# Patient Record
Sex: MALE | Race: WHITE | NOT HISPANIC OR LATINO | Employment: OTHER | URBAN - METROPOLITAN AREA
[De-identification: names, ages, dates, MRNs, and addresses within clinical notes are randomized per-mention and may not be internally consistent; named-entity substitution may affect disease eponyms.]

---

## 2017-10-10 ENCOUNTER — OFFICE VISIT (OUTPATIENT)
Dept: MEDICAL GROUP | Facility: MEDICAL CENTER | Age: 62
End: 2017-10-10
Payer: COMMERCIAL

## 2017-10-10 VITALS
RESPIRATION RATE: 16 BRPM | HEART RATE: 72 BPM | OXYGEN SATURATION: 95 % | SYSTOLIC BLOOD PRESSURE: 118 MMHG | DIASTOLIC BLOOD PRESSURE: 76 MMHG | WEIGHT: 230 LBS | TEMPERATURE: 97.4 F | BODY MASS INDEX: 34.07 KG/M2 | HEIGHT: 69 IN

## 2017-10-10 DIAGNOSIS — Z00.00 ANNUAL PHYSICAL EXAM: ICD-10-CM

## 2017-10-10 DIAGNOSIS — E66.9 OBESITY (BMI 30-39.9): ICD-10-CM

## 2017-10-10 DIAGNOSIS — N52.9 ERECTILE DYSFUNCTION, UNSPECIFIED ERECTILE DYSFUNCTION TYPE: ICD-10-CM

## 2017-10-10 PROCEDURE — 99396 PREV VISIT EST AGE 40-64: CPT | Performed by: PHYSICIAN ASSISTANT

## 2017-10-10 ASSESSMENT — PATIENT HEALTH QUESTIONNAIRE - PHQ9: CLINICAL INTERPRETATION OF PHQ2 SCORE: 0

## 2017-10-10 NOTE — LETTER
[employer]  Topher Brunner P.A.-C.  [address]  [place] [address]  Fax: [phone]   Authorization for Release/Disclosure of   Protected Health Information   Name: DINSEH GODOY : 1955 SSN: [id number]   Address: [address] [place] [address] Phone:    [phone] (home)    I authorize the entity listed below to release/disclose the PHI below to:   [employer]/Topher Brunner P.A.-C. and Topher Brunner P.A.-C.   Provider or Entity Name:  [facility]   Address   [facility], [place], [place]   Phone:      Fax:     Reason for request: continuity of care   Information to be released:    [  ] LAST COLONOSCOPY,  including any PATH REPORT and follow-up  [  ] LAST FIT/COLOGUARD RESULT [  ] LAST DEXA  [  ] LAST MAMMOGRAM  [  ] LAST PAP  [  ] LAST LABS [  ] RETINA EXAM REPORT  [  ] IMMUNIZATION RECORDS  [ x ] Release all info      [  ] Check here and initial the line next to each item to release ALL health information INCLUDING  _____ Care and treatment for drug and / or alcohol abuse  _____ HIV testing, infection status, or AIDS  _____ Genetic Testing    DATES OF SERVICE OR TIME PERIOD TO BE DISCLOSED: _____________  I understand and acknowledge that:  * This Authorization may be revoked at any time by you in writing, except if your health information has already been used or disclosed.  * Your health information that will be used or disclosed as a result of you signing this authorization could be re-disclosed by the recipient. If this occurs, your re-disclosed health information may no longer be protected by State or Federal laws.  * You may refuse to sign this Authorization. Your refusal will not affect your ability to obtain treatment.  * This Authorization becomes effective upon signing and will  on (date) __________.      If no date is indicated, this Authorization will  one (1) year from the signature date.    Name: Dinesh Godoy    Signature:   Date:          10/10/2017       PLEASE FAX REQUESTED RECORDS BACK TO: [phone]

## 2017-10-10 NOTE — ASSESSMENT & PLAN NOTE
This is a 61-year-old male who is here today for a physical. Has a form to be completed by his employer. Has no significant chronic medical conditions. Did have his gallbladder removed last year. Had a colonoscopy about 2 years ago. No polyps were found. Has never had chickenpox. Has never had a influenza vaccination. Never got the flu. He currently works from home and flies occasionally to [facility].

## 2017-10-10 NOTE — ASSESSMENT & PLAN NOTE
Doesn't have intercourse often with his wife but does suffer from some erectile dysfunction. Requesting possible medication.

## 2017-10-10 NOTE — LETTER
[facility]  Topher Brunner P.A.-C.  [address]  [place] [address]  Fax: [phone]   Authorization for Release/Disclosure of   Protected Health Information   Name: DINESH JOHNS : 1955 SSN: [id number]   [address] [place] [address] Phone:    [phone] (home)    I authorize the entity listed below to release/disclose the PHI below to:   [facility]/Topher Brunner P.A.-C. and Topher Brunner P.A.-C.   Provider or Entity Name:  [facility]   Address   City, State, [facility]   Phone:      Fax:     Reason for request: continuity of care   Information to be released:    [x  ] LAST COLONOSCOPY,  including any PATH REPORT and follow-up  [ x ] LAST FIT/COLOGUARD RESULT [  ] LAST DEXA  [  ] LAST MAMMOGRAM  [  ] LAST PAP  [  ] LAST LABS [  ] RETINA EXAM REPORT  [  ] IMMUNIZATION RECORDS  [  ] Release all info      [  ] Check here and initial the line next to each item to release ALL health information INCLUDING  _____ Care and treatment for drug and / or alcohol abuse  _____ HIV testing, infection status, or AIDS  _____ Genetic Testing    DATES OF SERVICE OR TIME PERIOD TO BE DISCLOSED: _____________  I understand and acknowledge that:  * This Authorization may be revoked at any time by you in writing, except if your health information has already been used or disclosed.  * Your health information that will be used or disclosed as a result of you signing this authorization could be re-disclosed by the recipient. If this occurs, your re-disclosed health information may no longer be protected by State or Federal laws.  * You may refuse to sign this Authorization. Your refusal will not affect your ability to obtain treatment.  * This Authorization becomes effective upon signing and will  on (date) __________.      If no date is indicated, this Authorization will  one (1) year from the signature date.    Name: Dinesh Johns    Signature:   Date:     10/10/2017       PLEASE FAX REQUESTED RECORDS BACK TO: [phone]

## 2017-10-10 NOTE — PROGRESS NOTES
"Subjective:   Dinesh Godoy is a 61 y.o. male here today for annual exam.    Annual physical exam  This is a 61-year-old male who is here today for a physical. Has a form to be completed by his employer. Has no significant chronic medical conditions. Did have his gallbladder removed last year. Had a colonoscopy about 2 years ago. No polyps were found. Has never had chickenpox. Has never had a influenza vaccination. Never got the flu. He currently works from home and flies occasionally to [facility].    Erectile dysfunction  Doesn't have intercourse often with his wife but does suffer from some erectile dysfunction. Requesting possible medication.       Current medicines (including changes today)  No current outpatient prescriptions on file.     No current facility-administered medications for this visit.      He  has no past medical history on file.    ROS   No chest pain, no shortness of breath, no abdominal pain and all other systems were reviewed and are negative.       Objective:     Blood pressure 118/76, pulse 72, temperature 36.3 °C (97.4 °F), resp. rate 16, height 1.753 m (5' 9\"), weight 104.3 kg (230 lb), SpO2 95 %. Body mass index is 33.97 kg/m².   Physical Exam:  Constitutional: Alert, no distress.  Skin: Warm, dry, good turgor, no rashes in visible areas.  Eye: Equal, round and reactive, conjunctiva clear, lids normal.  ENMT: Lips without lesions, good dentition, oropharynx clear.  Neck: Trachea midline, no masses.   Lymph: No cervical or supraclavicular lymphadenopathy  Respiratory: Unlabored respiratory effort, lungs clear to auscultation, no wheezes, no ronchi.  Cardiovascular: Normal S1, S2, no murmur, no edema.  Abdomen: Soft, non-tender, no masses.  Psych: Alert and oriented x3, normal affect and mood.        Assessment and Plan:   The following treatment plan was discussed    1. Annual physical exam  Ordered labs fasting. We'll keep form and then have him come and pick it up once his labs " are completed.  - PROSTATE SPECIFIC AG  - LIPID PANEL  - HEMOGLOBIN A1C; Future  - COMP METABOLIC PANEL; Future    2. Obesity (BMI 30-39.9)  Advised exercise routinely and eat healthy.  - Patient identified as having weight management issue.  Appropriate orders and counseling given.    3. Erectile dysfunction, unspecified erectile dysfunction type  Chronic condition. Once labs are completed will supply him Viagra 50 mg as directed.      Followup: Return if symptoms worsen or fail to improve.    Please note that this dictation was created using voice recognition software. I have made every reasonable attempt to correct obvious errors, but I expect that there are errors of grammar and possibly content that I did not discover before finalizing the note.

## 2017-10-12 ENCOUNTER — HOSPITAL ENCOUNTER (OUTPATIENT)
Dept: LAB | Facility: MEDICAL CENTER | Age: 62
End: 2017-10-12
Attending: PHYSICIAN ASSISTANT
Payer: COMMERCIAL

## 2017-10-12 DIAGNOSIS — Z00.00 ANNUAL PHYSICAL EXAM: ICD-10-CM

## 2017-10-12 LAB
ALBUMIN SERPL BCP-MCNC: 4.2 G/DL (ref 3.2–4.9)
ALBUMIN/GLOB SERPL: 1.5 G/DL
ALP SERPL-CCNC: 58 U/L (ref 30–99)
ALT SERPL-CCNC: 23 U/L (ref 2–50)
ANION GAP SERPL CALC-SCNC: 7 MMOL/L (ref 0–11.9)
AST SERPL-CCNC: 22 U/L (ref 12–45)
BILIRUB SERPL-MCNC: 0.5 MG/DL (ref 0.1–1.5)
BUN SERPL-MCNC: 25 MG/DL (ref 8–22)
CALCIUM SERPL-MCNC: 9.3 MG/DL (ref 8.5–10.5)
CHLORIDE SERPL-SCNC: 106 MMOL/L (ref 96–112)
CHOLEST SERPL-MCNC: 167 MG/DL (ref 100–199)
CO2 SERPL-SCNC: 26 MMOL/L (ref 20–33)
CREAT SERPL-MCNC: 1.2 MG/DL (ref 0.5–1.4)
EST. AVERAGE GLUCOSE BLD GHB EST-MCNC: 128 MG/DL
GFR SERPL CREATININE-BSD FRML MDRD: >60 ML/MIN/1.73 M 2
GLOBULIN SER CALC-MCNC: 2.8 G/DL (ref 1.9–3.5)
GLUCOSE SERPL-MCNC: 92 MG/DL (ref 65–99)
HBA1C MFR BLD: 6.1 % (ref 0–5.6)
HDLC SERPL-MCNC: 45 MG/DL
LDLC SERPL CALC-MCNC: 98 MG/DL
POTASSIUM SERPL-SCNC: 4.4 MMOL/L (ref 3.6–5.5)
PROT SERPL-MCNC: 7 G/DL (ref 6–8.2)
PSA SERPL-MCNC: 1.83 NG/ML (ref 0–4)
SODIUM SERPL-SCNC: 139 MMOL/L (ref 135–145)
TRIGL SERPL-MCNC: 118 MG/DL (ref 0–149)

## 2017-10-12 PROCEDURE — 80061 LIPID PANEL: CPT

## 2017-10-12 PROCEDURE — 36415 COLL VENOUS BLD VENIPUNCTURE: CPT

## 2017-10-12 PROCEDURE — 80053 COMPREHEN METABOLIC PANEL: CPT

## 2017-10-12 PROCEDURE — 83036 HEMOGLOBIN GLYCOSYLATED A1C: CPT

## 2017-10-12 PROCEDURE — 84153 ASSAY OF PSA TOTAL: CPT

## 2017-10-13 ENCOUNTER — TELEPHONE (OUTPATIENT)
Dept: MEDICAL GROUP | Facility: MEDICAL CENTER | Age: 62
End: 2017-10-13

## 2017-10-13 NOTE — TELEPHONE ENCOUNTER
Phone Number Called: [phone] (home)      Message: Left message for patient to call us back.     Left Message for patient to call back: yes

## 2017-10-13 NOTE — TELEPHONE ENCOUNTER
----- Message from Topher Brunner P.A.-C. sent at 10/13/2017  7:12 AM PDT -----  Please contact Edu.  He has the beginning of prediabetes.  Eat healthier and exercise routinely.  F/u annual labs.  Thank you.    Topher

## 2017-10-16 NOTE — TELEPHONE ENCOUNTER
Phone Number Called: [phone] (home)       Message: Patient informed of msg.     Left Message for patient to call back: yes

## 2019-10-11 ENCOUNTER — OFFICE VISIT (OUTPATIENT)
Dept: URGENT CARE | Facility: CLINIC | Age: 64
End: 2019-10-11
Payer: MEDICARE

## 2019-10-11 VITALS
HEART RATE: 92 BPM | SYSTOLIC BLOOD PRESSURE: 108 MMHG | TEMPERATURE: 97.4 F | OXYGEN SATURATION: 95 % | HEIGHT: 69 IN | BODY MASS INDEX: 32.73 KG/M2 | RESPIRATION RATE: 14 BRPM | WEIGHT: 221 LBS | DIASTOLIC BLOOD PRESSURE: 68 MMHG

## 2019-10-11 DIAGNOSIS — J20.9 ACUTE BRONCHITIS, UNSPECIFIED ORGANISM: ICD-10-CM

## 2019-10-11 DIAGNOSIS — R05.9 COUGH: ICD-10-CM

## 2019-10-11 PROCEDURE — 99214 OFFICE O/P EST MOD 30 MIN: CPT | Performed by: NURSE PRACTITIONER

## 2019-10-11 RX ORDER — CODEINE PHOSPHATE AND GUAIFENESIN 10; 100 MG/5ML; MG/5ML
5 SOLUTION ORAL EVERY 4 HOURS PRN
Qty: 240 ML | Refills: 0 | Status: SHIPPED | OUTPATIENT
Start: 2019-10-11 | End: 2019-10-19

## 2019-10-11 RX ORDER — BENZONATATE 100 MG/1
100 CAPSULE ORAL 3 TIMES DAILY PRN
Qty: 60 CAP | Refills: 0 | Status: SHIPPED | OUTPATIENT
Start: 2019-10-11 | End: 2019-12-02

## 2019-10-11 RX ORDER — DOXYCYCLINE HYCLATE 100 MG/1
100 CAPSULE ORAL 2 TIMES DAILY
Qty: 20 CAP | Refills: 0 | Status: SHIPPED | OUTPATIENT
Start: 2019-10-11 | End: 2019-10-21

## 2019-10-11 ASSESSMENT — ENCOUNTER SYMPTOMS
EYE PAIN: 0
BACK PAIN: 0
PALPITATIONS: 0
CHILLS: 1
SORE THROAT: 1
EYE DISCHARGE: 0
SPUTUM PRODUCTION: 1
SHORTNESS OF BREATH: 1
SINUS PAIN: 1
HEADACHES: 0
NAUSEA: 0
COUGH: 1
WHEEZING: 0
DIZZINESS: 0
FEVER: 0

## 2019-10-11 ASSESSMENT — LIFESTYLE VARIABLES: SUBSTANCE_ABUSE: 0

## 2019-10-11 NOTE — PROGRESS NOTES
"Subjective:      Edu Godoy is a 63 y.o. male who presents with Cough (x5 day, chest congestion)      Reviewed past medical, surgical and family history. Reviewed prescription and OTC medications with patient in electronic health record today      No Known Allergies      HPI This is a new problem.  Edu is a 64 y/o male pt with cough and congestion x 2 weeks but significantly increased for the past 5 days. Dark green phlegm all day long. Hard to sleep secondary to coughing. Cough increases at night when he lays down. Treatments tried: Zinc and Silver Cillin. Mucinex .    This has not been helpful.   No other aggravating or alleviating factors.       Review of Systems   Constitutional: Positive for chills and malaise/fatigue. Negative for fever.   HENT: Positive for congestion, sinus pain and sore throat. Negative for ear pain.    Eyes: Negative for pain and discharge.   Respiratory: Positive for cough, sputum production and shortness of breath. Negative for wheezing.    Cardiovascular: Negative for chest pain and palpitations.   Gastrointestinal: Negative for nausea.   Musculoskeletal: Negative for back pain.   Neurological: Negative for dizziness and headaches.   Endo/Heme/Allergies: Negative for environmental allergies.   Psychiatric/Behavioral: Negative for substance abuse.          Objective:     /68 (BP Location: Left arm, Patient Position: Sitting)   Pulse 92   Temp 36.3 °C (97.4 °F)   Resp 14   Ht 1.753 m (5' 9\")   Wt 100.2 kg (221 lb)   SpO2 95%   BMI 32.64 kg/m²      Physical Exam   Constitutional: He is oriented to person, place, and time. Vital signs are normal. He appears well-developed and well-nourished.  Non-toxic appearance. No distress.   HENT:   Head: Normocephalic and atraumatic.   Right Ear: External ear normal.   Left Ear: External ear normal.   Nose: Nose normal.   Mouth/Throat: Oropharynx is clear and moist.   Eyes: Pupils are equal, round, and reactive to light. Conjunctivae are " normal. Right eye exhibits no discharge. Left eye exhibits no discharge.   Neck: Neck supple.   Cardiovascular: Normal rate and regular rhythm.   Pulmonary/Chest: Effort normal and breath sounds normal.   Lymphadenopathy:     He has no cervical adenopathy.        Right: No supraclavicular adenopathy present.        Left: No supraclavicular adenopathy present.   Neurological: He is alert and oriented to person, place, and time.   Skin: Skin is warm and dry. Capillary refill takes less than 2 seconds.   Psychiatric: He has a normal mood and affect. His behavior is normal.   Nursing note and vitals reviewed.              Assessment/Plan:     1. Cough  guaifenesin-codeine (ROBITUSSIN AC) Solution oral solution    benzonatate (TESSALON) 100 MG Cap   2. Acute bronchitis, unspecified organism  doxycycline (VIBRAMYCIN) 100 MG Cap     Educated in proper administration of medication(s) ordered today including safety, possible SE, risks, benefits, rationale and alternatives to therapy.       OTC antihistamine of choice. Follow manufactures dosing and safety guidelines.     Stay well hydrated    Return to clinic or PCP 5-7  days if current symptoms are not resolving in a satisfactory manner or sooner if new or worsening symptoms occur. Differential diagnosis, natural history, supportive care, and indications for immediate follow-up discussed at length.   Patient was advised of signs and symptoms which would warrant further evaluation and /or emergent evaluation in ER.  Verbalized agreement with this treatment plan and seemed to understand without barriers. Questions were encouraged and answered to patients satisfaction.     Advised not to drink ETOH, drive car or operate machinery while taking narcotic RX . Verbalizes understanding of safety instructions. Narc Check verified. [facility] Aware web site evaluation: I have obtained and reviewed patient utilization report from [facility] pharmacy database prior to writing  prescription for controlled substance II, III or IV per [place] bill . Opioid Risk Tool screen completed.  I believe the benefits of controlled substance therapy outweigh the risks. The reasons for prescribing controlled substances include non-narcotic, oral analgesic alternatives have been inadequate for pain control. Accordingly, I have discussed the risk and benefits, treatment plan, and alternative therapies with the patient.

## 2019-12-02 ENCOUNTER — OFFICE VISIT (OUTPATIENT)
Dept: MEDICAL GROUP | Facility: MEDICAL CENTER | Age: 64
End: 2019-12-02
Payer: MEDICARE

## 2019-12-02 VITALS
DIASTOLIC BLOOD PRESSURE: 68 MMHG | WEIGHT: 222 LBS | HEART RATE: 68 BPM | TEMPERATURE: 97.5 F | HEIGHT: 69 IN | SYSTOLIC BLOOD PRESSURE: 106 MMHG | BODY MASS INDEX: 32.88 KG/M2 | OXYGEN SATURATION: 94 % | RESPIRATION RATE: 16 BRPM

## 2019-12-02 DIAGNOSIS — L30.9 DERMATITIS: ICD-10-CM

## 2019-12-02 DIAGNOSIS — E66.9 OBESITY (BMI 30-39.9): ICD-10-CM

## 2019-12-02 DIAGNOSIS — Z28.21 PNEUMOCOCCAL VACCINATION DECLINED BY PATIENT: ICD-10-CM

## 2019-12-02 PROCEDURE — 99213 OFFICE O/P EST LOW 20 MIN: CPT | Performed by: PHYSICIAN ASSISTANT

## 2019-12-02 RX ORDER — ANASTROZOLE 1 MG/1
1 TABLET ORAL
Refills: 1 | COMMUNITY
Start: 2019-10-11 | End: 2021-01-18

## 2019-12-02 ASSESSMENT — PATIENT HEALTH QUESTIONNAIRE - PHQ9: CLINICAL INTERPRETATION OF PHQ2 SCORE: 0

## 2019-12-02 NOTE — ASSESSMENT & PLAN NOTE
This is a 64-year-old male who comes in today to discuss noticing a red romy on his right arm for a couple months.  States that he gave blood in the spring and then a couple months later the romy showed up.  Denies any itching.  Is concerned about a possible precancerous lesion.  Has no history of any recent lesions.  Believes that the romy has been spreading.  Does have a red romy on his left shoulder that he believes is dry skin.  Applies cream as needed.  No other complaints today.  Currently not on any medications.  Is on his last bottle of anastrozole.

## 2019-12-02 NOTE — PROGRESS NOTES
"Subjective:   Dinesh Godoy is a 64 y.o. male here today for lesion on his right antecubital fossa for couple months.    Dermatitis  This is a 64-year-old male who comes in today to discuss noticing a red romy on his right arm for a couple months.  States that he gave blood in the spring and then a couple months later the romy showed up.  Denies any itching.  Is concerned about a possible precancerous lesion.  Has no history of any recent lesions.  Believes that the romy has been spreading.  Does have a red romy on his left shoulder that he believes is dry skin.  Applies cream as needed.  No other complaints today.  Currently not on any medications.  Is on his last bottle of anastrozole.       Current medicines (including changes today)  Current Outpatient Medications   Medication Sig Dispense Refill   • anastrozole (ARIMIDEX) 1 MG Tab Take 1 mg by mouth.  1     No current facility-administered medications for this visit.      He  has no past medical history on file.    Social History and Family History were reviewed and updated.    ROS   No chest pain, no shortness of breath, no abdominal pain and all other systems were reviewed and are negative.       Objective:     /68 (BP Location: Right arm, Patient Position: Sitting, BP Cuff Size: Adult)   Pulse 68   Temp 36.4 °C (97.5 °F) (Temporal)   Resp 16   Ht 1.753 m (5' 9\")   Wt 100.7 kg (222 lb)   SpO2 94%  Body mass index is 32.78 kg/m².   Physical Exam:  Constitutional: Alert, no distress.  Skin: Warm, dry, good turgor, no rashes in visible areas.  There are approximately 5 mm x 3 mm.  Appears slightly erythematous.  No significant discoloration noted.  No induration.  Similar but larger romy on the left shoulder.  Eye: Equal, round and reactive, conjunctiva clear, lids normal.  ENMT: Lips without lesions, good dentition, oropharynx clear.  Neck: Trachea midline, no masses.   Lymph: No cervical or supraclavicular lymphadenopathy  Respiratory: Unlabored " respiratory effort, lungs clear to auscultation, no wheezes, no ronchi.  Cardiovascular: Normal S1, S2, no murmur, no edema.  Psych: Alert and oriented x3, normal affect and mood.        Assessment and Plan:   The following treatment plan was discussed    1. Dermatitis  Acute, new onset condition.  Likely not a precancerous lesion.  Advised to make note of it with a photo.  With any changes he is to contact me through my chart for a referral to dermatology.    2. Obesity (BMI 30-39.9)  Chronic condition.  We will continue to monitor.  - Patient identified as having weight management issue.  Appropriate orders and counseling given.    3. Pneumococcal vaccination declined by patient  Declined vaccinations.  Updated healthcare maintenance items.  Also declined hep C viral antibody evaluation.      Followup: Return if symptoms worsen or fail to improve.    Please note that this dictation was created using voice recognition software. I have made every reasonable attempt to correct obvious errors, but I expect that there are errors of grammar and possibly content that I did not discover before finalizing the note.

## 2021-01-18 ENCOUNTER — OFFICE VISIT (OUTPATIENT)
Dept: MEDICAL GROUP | Facility: MEDICAL CENTER | Age: 66
End: 2021-01-18
Payer: MEDICARE

## 2021-01-18 VITALS
HEART RATE: 78 BPM | DIASTOLIC BLOOD PRESSURE: 68 MMHG | BODY MASS INDEX: 33.18 KG/M2 | RESPIRATION RATE: 16 BRPM | WEIGHT: 224 LBS | SYSTOLIC BLOOD PRESSURE: 124 MMHG | HEIGHT: 69 IN | OXYGEN SATURATION: 96 % | TEMPERATURE: 97.9 F

## 2021-01-18 DIAGNOSIS — Z12.5 SCREENING PSA (PROSTATE SPECIFIC ANTIGEN): ICD-10-CM

## 2021-01-18 DIAGNOSIS — E66.9 OBESITY (BMI 30-39.9): ICD-10-CM

## 2021-01-18 DIAGNOSIS — L03.113 CELLULITIS OF RIGHT UPPER EXTREMITY: ICD-10-CM

## 2021-01-18 DIAGNOSIS — E78.00 ELEVATED CHOLESTEROL: ICD-10-CM

## 2021-01-18 DIAGNOSIS — L30.9 DERMATITIS: ICD-10-CM

## 2021-01-18 DIAGNOSIS — Z28.21 PNEUMOCOCCAL VACCINATION DECLINED BY PATIENT: ICD-10-CM

## 2021-01-18 DIAGNOSIS — R73.03 PREDIABETES: ICD-10-CM

## 2021-01-18 PROBLEM — Z00.00 ANNUAL PHYSICAL EXAM: Status: RESOLVED | Noted: 2017-10-10 | Resolved: 2021-01-18

## 2021-01-18 PROCEDURE — 99214 OFFICE O/P EST MOD 30 MIN: CPT | Performed by: PHYSICIAN ASSISTANT

## 2021-01-18 RX ORDER — TRIAMCINOLONE ACETONIDE 1 MG/G
1 CREAM TOPICAL 2 TIMES DAILY
Qty: 453.6 G | Refills: 0 | Status: SHIPPED | OUTPATIENT
Start: 2021-01-18

## 2021-01-18 RX ORDER — DICLOFENAC SODIUM 75 MG/1
75 TABLET, DELAYED RELEASE ORAL 2 TIMES DAILY PRN
COMMUNITY
Start: 2020-11-02 | End: 2021-01-18

## 2021-01-18 RX ORDER — PREDNISONE 20 MG/1
TABLET ORAL
Qty: 18 TAB | Refills: 0 | Status: SHIPPED | OUTPATIENT
Start: 2021-01-18

## 2021-01-18 ASSESSMENT — PATIENT HEALTH QUESTIONNAIRE - PHQ9: CLINICAL INTERPRETATION OF PHQ2 SCORE: 0

## 2021-01-19 NOTE — PROGRESS NOTES
"Subjective:   Dinesh Godoy is a 65 y.o. male here today for dermatitis.    Dermatitis  This is a pleasant 65-year-old male who comes in today complaining of a rash on his body since mid December.  Initially the rash began on the back of his leg.  Now the rashes on his upper body and legs.  Mostly on the arms and back.  Also lower extremities.  Complains of significant itching.  States that he has been rubbing his back up against a wall.  Using over-the-counter aids such as 1% hydrocortisone cream.  Thought possibly that the rash was secondary to the steroid injection last fall but rash occurred months after.  Also thought that possibly the rash was from diclofenac but stopped the medication and it did not resolve.  Also thought that possibly the rash was from being in his swimming pool but after a week of not swimming no change.    Prediabetes  A1c in 2017 was 6.1.    Elevated cholesterol  History of elevated cholesterol but 2017 his cholesterol profile was excellent.       Current medicines (including changes today)  Current Outpatient Medications   Medication Sig Dispense Refill   • predniSONE (DELTASONE) 20 MG Tab Take 3 tabs day 1-3 then two tabs day 4-6 and 1 tab day 7-9. 18 Tab 0   • triamcinolone acetonide (KENALOG) 0.1 % Cream Apply 1 Application topically 2 times a day. 453.6 g 0     No current facility-administered medications for this visit.      He  has no past medical history on file.    Social History and Family History were reviewed and updated.    ROS   No chest pain, no shortness of breath, no abdominal pain and all other systems were reviewed and are negative.       Objective:     /68   Pulse 78   Temp 36.6 °C (97.9 °F) (Temporal)   Resp 16   Ht 1.753 m (5' 9\")   Wt 101.6 kg (224 lb)   SpO2 96%  Body mass index is 33.08 kg/m².   Physical Exam:  Constitutional: Alert, no distress.  Skin: Warm, dry, good turgor.  Noticeable rash on bilateral arms on the posterior aspect and his back.  " Erythema noted.  Excoriations.  Generalized.  No pattern.  No distinct borders.  Worsening erythema on his left arm.  Eye: Equal, round and reactive, conjunctiva clear, lids normal.  ENMT: Lips without lesions, good dentition, oropharynx clear.  Neck: Trachea midline, no masses.   Lymph: No cervical or supraclavicular lymphadenopathy  Respiratory: Unlabored respiratory effort, lungs appear clear, no wheezes.  Cardiovascular: Regular rate rhythm.  Psych: Alert and oriented x3, normal affect and mood.        Assessment and Plan:   The following treatment plan was discussed    1. Dermatitis  Acute, new onset condition.  Unknown cause.  Ordered labs.  Discussed prescribing tapering course of prednisone.  Also provided cream as directed.  Advised to limit contact of the skin from hot water.  Use over-the-counter Vaseline after bathing and Vanicream liberally twice a day.  Refer to dermatology if there is no improvement.  - Comp Metabolic Panel; Future  - TSH WITH REFLEX TO FT4; Future  - predniSONE (DELTASONE) 20 MG Tab; Take 3 tabs day 1-3 then two tabs day 4-6 and 1 tab day 7-9.  Dispense: 18 Tab; Refill: 0  - triamcinolone acetonide (KENALOG) 0.1 % Cream; Apply 1 Application topically 2 times a day.  Dispense: 453.6 g; Refill: 0  - REFERRAL TO DERMATOLOGY    2. Cellulitis of right upper extremity  Acute, new onset condition.  Ordered CBC.  Will hold off on providing antibiotic.  - CBC WITH DIFFERENTIAL; Future    3. Prediabetes  Chronic condition.  Status unknown.  A1c ordered.  - HEMOGLOBIN A1C; Future    4. Elevated cholesterol  Chronic condition.  Status unknown.  Order cholesterol profile.  - Lipid Profile; Future    5. Screening PSA (prostate specific antigen)  PSA ordered.  Screening.  - PROSTATE SPECIFIC AG SCREENING; Future    6. Pneumococcal vaccination declined by patient  Discussed need for pneumonia vaccinations but he declined.    Followup: No follow-ups on file.    Please note that this dictation was  created using voice recognition software. I have made every reasonable attempt to correct obvious errors, but I expect that there are errors of grammar and possibly content that I did not discover before finalizing the note.

## 2021-01-19 NOTE — ASSESSMENT & PLAN NOTE
This is a pleasant 65-year-old male who comes in today complaining of a rash on his body since mid December.  Initially the rash began on the back of his leg.  Now the rashes on his upper body and legs.  Mostly on the arms and back.  Also lower extremities.  Complains of significant itching.  States that he has been rubbing his back up against a wall.  Using over-the-counter aids such as 1% hydrocortisone cream.  Thought possibly that the rash was secondary to the steroid injection last fall but rash occurred months after.  Also thought that possibly the rash was from diclofenac but stopped the medication and it did not resolve.  Also thought that possibly the rash was from being in his swimming pool but after a week of not swimming no change.

## 2021-01-20 ENCOUNTER — HOSPITAL ENCOUNTER (OUTPATIENT)
Dept: LAB | Facility: MEDICAL CENTER | Age: 66
End: 2021-01-20
Attending: PHYSICIAN ASSISTANT
Payer: MEDICARE

## 2021-01-20 DIAGNOSIS — L30.9 DERMATITIS: ICD-10-CM

## 2021-01-20 DIAGNOSIS — Z12.5 SCREENING PSA (PROSTATE SPECIFIC ANTIGEN): ICD-10-CM

## 2021-01-20 DIAGNOSIS — E78.00 ELEVATED CHOLESTEROL: ICD-10-CM

## 2021-01-20 DIAGNOSIS — L03.113 CELLULITIS OF RIGHT UPPER EXTREMITY: ICD-10-CM

## 2021-01-20 DIAGNOSIS — R73.03 PREDIABETES: ICD-10-CM

## 2021-01-20 LAB
ALBUMIN SERPL BCP-MCNC: 4.4 G/DL (ref 3.2–4.9)
ALBUMIN/GLOB SERPL: 1.8 G/DL
ALP SERPL-CCNC: 51 U/L (ref 30–99)
ALT SERPL-CCNC: 28 U/L (ref 2–50)
ANION GAP SERPL CALC-SCNC: 7 MMOL/L (ref 7–16)
AST SERPL-CCNC: 24 U/L (ref 12–45)
BASOPHILS # BLD AUTO: 0.3 % (ref 0–1.8)
BASOPHILS # BLD: 0.02 K/UL (ref 0–0.12)
BILIRUB SERPL-MCNC: 0.4 MG/DL (ref 0.1–1.5)
BUN SERPL-MCNC: 20 MG/DL (ref 8–22)
CALCIUM SERPL-MCNC: 9.4 MG/DL (ref 8.5–10.5)
CHLORIDE SERPL-SCNC: 105 MMOL/L (ref 96–112)
CHOLEST SERPL-MCNC: 189 MG/DL (ref 100–199)
CO2 SERPL-SCNC: 28 MMOL/L (ref 20–33)
CREAT SERPL-MCNC: 1.05 MG/DL (ref 0.5–1.4)
EOSINOPHIL # BLD AUTO: 0.04 K/UL (ref 0–0.51)
EOSINOPHIL NFR BLD: 0.5 % (ref 0–6.9)
ERYTHROCYTE [DISTWIDTH] IN BLOOD BY AUTOMATED COUNT: 43.8 FL (ref 35.9–50)
EST. AVERAGE GLUCOSE BLD GHB EST-MCNC: 103 MG/DL
FASTING STATUS PATIENT QL REPORTED: NORMAL
GLOBULIN SER CALC-MCNC: 2.5 G/DL (ref 1.9–3.5)
GLUCOSE SERPL-MCNC: 100 MG/DL (ref 65–99)
HBA1C MFR BLD: 5.2 % (ref 0–5.6)
HCT VFR BLD AUTO: 44.9 % (ref 42–52)
HDLC SERPL-MCNC: 50 MG/DL
HGB BLD-MCNC: 14.5 G/DL (ref 14–18)
IMM GRANULOCYTES # BLD AUTO: 0.02 K/UL (ref 0–0.11)
IMM GRANULOCYTES NFR BLD AUTO: 0.3 % (ref 0–0.9)
LDLC SERPL CALC-MCNC: 119 MG/DL
LYMPHOCYTES # BLD AUTO: 1.85 K/UL (ref 1–4.8)
LYMPHOCYTES NFR BLD: 23.8 % (ref 22–41)
MCH RBC QN AUTO: 30.1 PG (ref 27–33)
MCHC RBC AUTO-ENTMCNC: 32.3 G/DL (ref 33.7–35.3)
MCV RBC AUTO: 93.3 FL (ref 81.4–97.8)
MONOCYTES # BLD AUTO: 0.42 K/UL (ref 0–0.85)
MONOCYTES NFR BLD AUTO: 5.4 % (ref 0–13.4)
NEUTROPHILS # BLD AUTO: 5.43 K/UL (ref 1.82–7.42)
NEUTROPHILS NFR BLD: 69.7 % (ref 44–72)
NRBC # BLD AUTO: 0 K/UL
NRBC BLD-RTO: 0 /100 WBC
PLATELET # BLD AUTO: 258 K/UL (ref 164–446)
PMV BLD AUTO: 10.1 FL (ref 9–12.9)
POTASSIUM SERPL-SCNC: 4.3 MMOL/L (ref 3.6–5.5)
PROT SERPL-MCNC: 6.9 G/DL (ref 6–8.2)
PSA SERPL-MCNC: 1.28 NG/ML (ref 0–4)
RBC # BLD AUTO: 4.81 M/UL (ref 4.7–6.1)
SODIUM SERPL-SCNC: 140 MMOL/L (ref 135–145)
TRIGL SERPL-MCNC: 99 MG/DL (ref 0–149)
TSH SERPL DL<=0.005 MIU/L-ACNC: 1.18 UIU/ML (ref 0.38–5.33)
WBC # BLD AUTO: 7.8 K/UL (ref 4.8–10.8)

## 2021-01-20 PROCEDURE — 80053 COMPREHEN METABOLIC PANEL: CPT

## 2021-01-20 PROCEDURE — 80061 LIPID PANEL: CPT

## 2021-01-20 PROCEDURE — 85025 COMPLETE CBC W/AUTO DIFF WBC: CPT

## 2021-01-20 PROCEDURE — 36415 COLL VENOUS BLD VENIPUNCTURE: CPT

## 2021-01-20 PROCEDURE — 83036 HEMOGLOBIN GLYCOSYLATED A1C: CPT | Mod: GA

## 2021-01-20 PROCEDURE — 84443 ASSAY THYROID STIM HORMONE: CPT

## 2021-01-20 PROCEDURE — 84153 ASSAY OF PSA TOTAL: CPT | Mod: GA

## 2021-03-03 DIAGNOSIS — Z23 NEED FOR VACCINATION: ICD-10-CM

## 2021-03-12 ENCOUNTER — IMMUNIZATION (OUTPATIENT)
Dept: FAMILY PLANNING/WOMEN'S HEALTH CLINIC | Facility: IMMUNIZATION CENTER | Age: 66
End: 2021-03-12
Attending: INTERNAL MEDICINE
Payer: MEDICARE

## 2021-03-12 DIAGNOSIS — Z23 NEED FOR VACCINATION: ICD-10-CM

## 2021-03-12 DIAGNOSIS — Z23 ENCOUNTER FOR VACCINATION: Primary | ICD-10-CM

## 2021-03-12 PROCEDURE — 91301 MODERNA SARS-COV-2 VACCINE: CPT

## 2021-03-12 PROCEDURE — 0011A MODERNA SARS-COV-2 VACCINE: CPT

## 2021-04-10 ENCOUNTER — IMMUNIZATION (OUTPATIENT)
Dept: FAMILY PLANNING/WOMEN'S HEALTH CLINIC | Facility: IMMUNIZATION CENTER | Age: 66
End: 2021-04-10
Attending: INTERNAL MEDICINE
Payer: MEDICARE

## 2021-04-10 DIAGNOSIS — Z23 ENCOUNTER FOR VACCINATION: Primary | ICD-10-CM

## 2021-04-10 PROCEDURE — 91301 MODERNA SARS-COV-2 VACCINE: CPT

## 2021-04-10 PROCEDURE — 0012A MODERNA SARS-COV-2 VACCINE: CPT

## 2022-06-20 ENCOUNTER — HOSPITAL ENCOUNTER (OUTPATIENT)
Dept: CARDIOLOGY | Facility: MEDICAL CENTER | Age: 67
End: 2022-06-20
Attending: ANESTHESIOLOGY
Payer: MEDICARE

## 2022-06-20 DIAGNOSIS — Z01.810 PRE-OPERATIVE CARDIOVASCULAR EXAMINATION: ICD-10-CM

## 2022-06-20 PROCEDURE — 93005 ELECTROCARDIOGRAM TRACING: CPT

## 2022-06-21 LAB — EKG IMPRESSION: NORMAL

## 2022-06-21 PROCEDURE — 93010 ELECTROCARDIOGRAM REPORT: CPT | Performed by: INTERNAL MEDICINE

## 2022-09-20 ENCOUNTER — APPOINTMENT (OUTPATIENT)
Dept: URGENT CARE | Facility: CLINIC | Age: 67
End: 2022-09-20
Payer: MEDICARE

## 2022-09-20 ENCOUNTER — OFFICE VISIT (OUTPATIENT)
Dept: URGENT CARE | Facility: CLINIC | Age: 67
End: 2022-09-20
Payer: MEDICARE

## 2022-09-20 VITALS
WEIGHT: 228 LBS | DIASTOLIC BLOOD PRESSURE: 76 MMHG | OXYGEN SATURATION: 94 % | HEIGHT: 69 IN | RESPIRATION RATE: 16 BRPM | SYSTOLIC BLOOD PRESSURE: 132 MMHG | TEMPERATURE: 98 F | HEART RATE: 82 BPM | BODY MASS INDEX: 33.77 KG/M2

## 2022-09-20 DIAGNOSIS — U07.1 COVID-19 VIRUS INFECTION: ICD-10-CM

## 2022-09-20 PROCEDURE — 99214 OFFICE O/P EST MOD 30 MIN: CPT | Performed by: STUDENT IN AN ORGANIZED HEALTH CARE EDUCATION/TRAINING PROGRAM

## 2022-09-20 RX ORDER — HYDROCODONE BITARTRATE AND ACETAMINOPHEN 5; 325 MG/1; MG/1
1 TABLET ORAL EVERY 6 HOURS PRN
COMMUNITY
Start: 2022-07-07

## 2022-09-20 ASSESSMENT — FIBROSIS 4 INDEX: FIB4 SCORE: 1.2

## 2022-09-20 NOTE — PROGRESS NOTES
Subjective:   CHIEF COMPLAINT  Chief Complaint   Patient presents with    Runny Nose     X5days, Cough, sweating, test positive for covid yesterday        HPI  Dinesh Godoy is a 66 y.o. male presents with a COVID-19 infection.  For 4 days the patient has been experiencing a runny nose, sore throat and cough.  Symptoms developed after going on an [employer] cruise.  He has tried using Tessalon Perles which has not helped.  Positive ROS for shortness of breath and night sweats.  No fever.  No wheezing.  Vaccinated against COVID x3.    REVIEW OF SYSTEMS  General: no fever or chills  GI: no nausea or vomiting  See HPI for further details.    PAST MEDICAL HISTORY  Patient Active Problem List    Diagnosis Date Noted    Prediabetes 01/18/2021    Cellulitis of right upper extremity 01/18/2021    Elevated cholesterol 01/18/2021    Dermatitis 12/02/2019    Pneumococcal vaccination declined by patient 12/02/2019    Obesity (BMI 30-39.9) 10/10/2017    Erectile dysfunction 10/10/2017       SURGICAL HISTORY   has a past surgical history that includes cholecystectomy and vasectomy.    ALLERGIES  No Known Allergies    CURRENT MEDICATIONS  Home Medications       Reviewed by Carlitos Julien D.O. (Physician) on 09/21/22 at 0853  Med List Status: <None>     Medication Last Dose Status   HYDROcodone-acetaminophen (NORCO) 5-325 MG Tab per tablet Not Taking Active   Nirmatrelvir&Ritonavir 300/100 20 x 150 MG & 10 x 100MG Tablet Therapy Pack  Active   predniSONE (DELTASONE) 20 MG Tab Not Taking Active   triamcinolone acetonide (KENALOG) 0.1 % Cream Not Taking Active                    SOCIAL HISTORY  Social History     Tobacco Use    Smoking status: Never    Smokeless tobacco: Never   Vaping Use    Vaping Use: Never used   Substance and Sexual Activity    Alcohol use: Yes     Comment: Less than once weekly    Drug use: No    Sexual activity: Not on file     Comment: , 3 children, 2 grands       FAMILY HISTORY  History reviewed. No  "pertinent family history.       Objective:   PHYSICAL EXAM  VITAL SIGNS: /76   Pulse 82   Temp 36.7 °C (98 °F) (Temporal)   Resp 16   Ht 1.753 m (5' 9\")   Wt 103 kg (228 lb)   SpO2 94%   BMI 33.67 kg/m²     Gen: no acute distress, normal voice  Skin: dry, intact, moist mucosal membranes  Lungs: CTAB w/ symmetric expansion  CV: RRR w/o murmurs or clicks  Psych: normal affect, normal judgement, alert, awake    Assessment/Plan:     1. COVID-19 virus infection  Nirmatrelvir&Ritonavir 300/100 20 x 150 MG & 10 x 100MG Tablet Therapy Pack      Patient tested positive for COVID-19 via home test which would explain his symptoms.  He is immunized against COVID x3.  Given his age he is at higher risk for developing severe COVID and recommended starting on antiviral; patient agreed.  Reviewed home medications and no drug to drug interactions.  Patient has good kidney function.  - Order Rx for paxlovid  - Push fluids and OTCs as needed for symptomatic relief  - Return to urgent care any new/worsening symptoms or further questions or concerns.  Patient understood everything discussed.  All questions were answered.    Differential diagnosis, natural history, supportive care, and indications for immediate follow-up discussed. All questions answered. Patient agrees with the plan of care.    Follow-up as needed if symptoms worsen or fail to improve to PCP, Urgent care or Emergency Room.    Please note that this dictation was created using voice recognition software. I have made a reasonable attempt to correct obvious errors, but I expect that there are errors of grammar and possibly content that I did not discover before finalizing the note.         "

## 2022-11-10 ENCOUNTER — PATIENT MESSAGE (OUTPATIENT)
Dept: HEALTH INFORMATION MANAGEMENT | Facility: OTHER | Age: 67
End: 2022-11-10